# Patient Record
Sex: FEMALE | Race: WHITE
[De-identification: names, ages, dates, MRNs, and addresses within clinical notes are randomized per-mention and may not be internally consistent; named-entity substitution may affect disease eponyms.]

---

## 2018-05-14 ENCOUNTER — HOSPITAL ENCOUNTER (EMERGENCY)
Dept: HOSPITAL 43 - DL.ED | Age: 83
Discharge: HOME | End: 2018-05-14
Payer: MEDICARE

## 2018-05-14 DIAGNOSIS — Z79.82: ICD-10-CM

## 2018-05-14 DIAGNOSIS — I10: Primary | ICD-10-CM

## 2018-05-14 DIAGNOSIS — Z88.8: ICD-10-CM

## 2018-05-14 LAB
CHLORIDE SERPL-SCNC: 96 MMOL/L (ref 101–111)
SODIUM SERPL-SCNC: 133 MMOL/L (ref 135–145)

## 2018-05-14 PROCEDURE — 36415 COLL VENOUS BLD VENIPUNCTURE: CPT

## 2018-05-14 PROCEDURE — 93005 ELECTROCARDIOGRAM TRACING: CPT

## 2018-05-14 PROCEDURE — 80053 COMPREHEN METABOLIC PANEL: CPT

## 2018-05-14 PROCEDURE — 85025 COMPLETE CBC W/AUTO DIFF WBC: CPT

## 2018-05-14 PROCEDURE — 81001 URINALYSIS AUTO W/SCOPE: CPT

## 2018-05-14 PROCEDURE — 84484 ASSAY OF TROPONIN QUANT: CPT

## 2018-05-14 PROCEDURE — 96374 THER/PROPH/DIAG INJ IV PUSH: CPT

## 2018-05-14 PROCEDURE — 71045 X-RAY EXAM CHEST 1 VIEW: CPT

## 2018-05-14 PROCEDURE — 99284 EMERGENCY DEPT VISIT MOD MDM: CPT

## 2018-05-14 PROCEDURE — 93010 ELECTROCARDIOGRAM REPORT: CPT

## 2018-05-14 NOTE — EDM.PDOC
ED HPI GENERAL MEDICAL PROBLEM





- General


Chief Complaint: General


Stated Complaint: HIGH BP. CAME FROM [facility]


Time Seen by Provider: 05/14/18 12:30


Source of Information: Reports: Patient, RN, RN Notes Reviewed


History Limitations: Reports: No Limitations





- History of Present Illness


INITIAL COMMENTS - FREE TEXT/NARRATIVE: 


Pt presents to the ER from [facility] with c/o high blood pressure. Patient states 

she takes BP medications as prescribed. She states she was seen by Madison Tucker GNP, last week and her BP was high, also saw Dr. Ziegler, gynecology, and at 

that time her BP was high. Patient states when she say Madison Tucker today, her 

BP continued to run quite high. She denies CP, SOB, N/V/D, fever, or chills. 

Patient states she has been dizzy at times and feels unsteady. Pt states when 

reading she notices the letters are somewhat "cloudy" but denies any other 

visual disturbances. 


Onset: Gradual





- Related Data


 Allergies











Allergy/AdvReac Type Severity Reaction Status Date / Time


 


atorvastatin [From Lipitor] Allergy  Muscle Verified 05/14/18 12:20





   Aches  


 


rosuvastatin [From Crestor] Allergy  Muscle Verified 05/14/18 12:20





   Aches  











Home Meds: 


 Home Meds





Acetaminophen [Tylenol Arthritis] 650 mg PO PRN 05/14/18 [History]


Alendronate Sodium [Fosamax] 70 mg PO WEEKLY 05/14/18 [History]


Aspirin [Halfprin] 81 mg PO DAILY 05/14/18 [History]


Calcium Carbonate/Vitamin D3 [Calcium 600 + Vit D 200] 1 tab PO DAILY 05/14/18 [

History]


Cholecalciferol (Vitamin D3) [Vitamin D3] 1,000 unit PO DAILY 05/14/18 [History]


Hydrochlorothiazide 25 mg PO DAILY 05/14/18 [History]


Metoprolol Succinate [Toprol XL 50mg] 75 mg PO DAILY 05/14/18 [History]


Multivitamin [Multi-Vitamin Daily] 1 tab PO DAILY 05/14/18 [History]


Potassium Chloride [Klor-Con 10] 10 meq PO DAILY 05/14/18 [History]


Simvastatin [Zocor] 40 mg PO QPM 05/14/18 [History]


Triamcinolone Acetonide [Triamcinolone Acetonide 0.1% Oint] 1 ml TOP PRN 05/14/ 18 [History]











Past Medical History


HEENT History: Reports: Impaired Vision


Cardiovascular History: Reports: High Cholesterol, Hypertension


OB/GYN History: Reports: Other (See Below)


Other OB/BYN History: biposy of vulva area





- Past Surgical History


Musculoskeletal Surgical History: Reports: Other (See Below)


Other Musculoskeletal Surgeries/Procedures:: left elbow





Social & Family History





- Family History


Family Medical History: Noncontributory





- Tobacco Use


Smoking Status *Q: Never Smoker


Second Hand Smoke Exposure: No





- Caffeine Use


Caffeine Use: Reports: Coffee





- Recreational Drug Use


Recreational Drug Use: No





ED ROS GENERAL





- Review of Systems


Review Of Systems: ROS reveals no pertinent complaints other than HPI.





ED EXAM, GENERAL





- Physical Exam


Exam: See Below


Exam Limited By: No Limitations


General Appearance: Alert, WD/WN, No Apparent Distress


Eye Exam: Bilateral Eye: EOMI, Normal Inspection


Ears: Normal External Exam, Hearing Grossly Normal


Nose: Normal Inspection


Throat/Mouth: Normal Inspection, Normal Lips, Normal Teeth, Normal Gums, Normal 

Oropharynx, Normal Voice, No Airway Compromise


Head: Atraumatic, Normocephalic


Neck: Normal Inspection, Supple, Non-Tender, Full Range of Motion


Respiratory/Chest: No Respiratory Distress, Lungs Clear, Normal Breath Sounds, 

No Accessory Muscle Use, Chest Non-Tender


Cardiovascular: Normal Peripheral Pulses, Regular Rate, Rhythm, No Edema, No 

Gallop, No JVD, No Murmur, No Rub


Peripheral Pulses: 2+: Radial (L), Radial (R)


GI/Abdominal: Normal Bowel Sounds, Soft, Non-Tender


 (Female) Exam: Deferred


Rectal (Female) Exam: Deferred


Back Exam: Normal Inspection, Full Range of Motion, NT


Extremities: Normal Inspection, Normal Range of Motion, Non-Tender, Normal 

Capillary Refill, No Pedal Edema


Neurological: Alert, Oriented, CN II-XII Intact, Normal Cognition, Normal Gait, 

Normal Reflexes, No Motor/Sensory Deficits


Psychiatric: Normal Affect, Normal Mood


Skin Exam: Warm, Dry, Intact, Normal Color, No Rash


Lymphatic: No Adenopathy





EKG INTERPRETATION


EKG Date: 05/14/18


Time: 12:43


Rhythm: Other (sinus bradycardia)


Rate (Beats/Min): 56


Axis: Normal


P-Wave: Present


QRS: Normal


ST-T: Normal


QT: Normal


Comparison: NA - No Prior EKG





Course





- Vital Signs


Last Recorded V/S: 


 Last Vital Signs











Temp  98.5 F   05/14/18 12:21


 


Pulse  66   05/14/18 12:21


 


Resp  18   05/14/18 12:21


 


BP  238/74 H  05/14/18 12:21


 


Pulse Ox  97   05/14/18 12:21














- Orders/Labs/Meds


Orders: 


 Active Orders 24 hr











 Category Date Time Status


 


 EKG Documentation Completion [RC] STAT Care  05/14/18 12:49 Active


 


 Peripheral IV Care [RC] .AS DIRECTED Care  05/14/18 12:50 Active


 


 Sodium Chloride 0.9% [Saline Flush] Med  05/14/18 12:49 Active





 10 ml FLUSH ASDIRECTED PRN   


 


 Peripheral IV Insertion Adult [OM.PC] Stat Oth  05/14/18 12:49 Ordered








 Medication Orders





Sodium Chloride (Saline Flush)  10 ml FLUSH ASDIRECTED PRN


   PRN Reason: Keep Vein Open


   Last Admin: 05/14/18 12:59  Dose: 10 ml








Labs: 


 Laboratory Tests











  05/14/18 05/14/18 05/14/18 Range/Units





  12:54 12:54 13:13 


 


WBC  6.3    (5.0-10.0)  10^3/uL


 


RBC  3.80 L    (4.2-5.4)  10^6/uL


 


Hgb  12.1    (12.0-16.0)  g/dL


 


Hct  35.9 L    (37.0-47.0)  %


 


MCV  94.5    ()  fL


 


MCH  31.8    (27.0-34.0)  pg


 


MCHC  33.7    (33.0-35.0)  g/dL


 


Plt Count  251    (150-450)  10^3/uL


 


Neut % (Auto)  74.5    (42.2-75.2)  %


 


Lymph % (Auto)  18.3 L    (20.5-50.1)  %


 


Mono % (Auto)  5.7    (2-8)  %


 


Eos % (Auto)  1.3    (1.0-3.0)  %


 


Baso % (Auto)  0.2    (0.0-1.0)  %


 


Sodium   133 L   (135-145)  mmol/L


 


Potassium   3.6   (3.6-5.0)  mmol/L


 


Chloride   96 L   (101-111)  mmol/L


 


Carbon Dioxide   29.0   (21.0-31.0)  mmol/L


 


Anion Gap   11.6   


 


BUN   14   (7-18)  mg/dL


 


Creatinine   0.7   (0.6-1.3)  mg/dL


 


Est Cr Clr Drug Dosing   48.16   mL/min


 


Estimated GFR (MDRD)   > 60   


 


BUN/Creatinine Ratio   20.00   


 


Glucose   102   ()  mg/dL


 


Calcium   9.2   (8.4-10.2)  mg/dl


 


Total Bilirubin   0.7   (0.2-1.0)  mg/dL


 


AST   25   (10-42)  IU/L


 


ALT   15   (10-60)  IU/L


 


Alkaline Phosphatase   52   ()  IU/L


 


Troponin I   < 0.02   (0.00-0.02)  ng/ml


 


Total Protein   7.3   (6.7-8.2)  g/dl


 


Albumin   3.9   (3.2-5.5)  g/dl


 


Globulin   3.4   


 


Albumin/Globulin Ratio   1.15   


 


Urine Color    Yellow  (YELLOW)  


 


Urine Appearance    Clear  (CLEAR)  


 


Urine pH    7.0  (5.0-9.0)  


 


Ur Specific Gravity    1.020  (1.005-1.030)  


 


Urine Protein    Negative  (NEGATIVE)  


 


Urine Glucose (UA)    Negative  (NEGATIVE)  


 


Urine Ketones    Trace H  (NEGATIVE)  


 


Urine Occult Blood    Small H  (NEGATIVE)  


 


Urine Nitrite    Negative  (NEGATIVE)  


 


Urine Bilirubin    Negative  (NEGATIVE)  


 


Urine Urobilinogen    0.2  (0.2-1.0)  mg/dL


 


Ur Leukocyte Esterase    Moderate H  (NEGATIVE)  


 


Urine RBC    0-5  /HPF


 


Urine WBC    0-5  (0-5/HPF)  /HPF


 


Ur Epithelial Cells    Few  /HPF


 


Urine Bacteria    Rare  (0-FEW/HPF)  /HPF


 


Urine Mucus    Rare  /LPF











Meds: 


Medications











Generic Name Dose Route Start Last Admin





  Trade Name Freq  PRN Reason Stop Dose Admin


 


Sodium Chloride  10 ml  05/14/18 12:49  05/14/18 12:59





  Saline Flush  FLUSH   10 ml





  ASDIRECTED PRN   Administration





  Keep Vein Open   





     





     





     














Discontinued Medications














Generic Name Dose Route Start Last Admin





  Trade Name Freq  PRN Reason Stop Dose Admin


 


Hydralazine HCl  10 mg  05/14/18 13:07  05/14/18 13:14





  Apresoline  IVPUSH  05/14/18 13:08  10 mg





  ONETIME ONE   Administration





     





     





     





     














- Radiology Interpretation


Free Text/Narrative:: 


chest xray: 





No acute findings. Subtle right suprahilar nodular density.





See rad report








Departure





- Departure


Time of Disposition: 15:11


Disposition: Home, Self-Care 01


Condition: Fair


Clinical Impression: 


Hypertension


Qualifiers:


 Hypertension type: unspecified Qualified Code(s): I10 - Essential (primary) 

hypertension








- Discharge Information


Instructions:  How to Take Your Blood Pressure, Easy-to-Read, Hypertension, Easy

-to-Read, DASH Eating Plan


Forms:  ED Department Discharge


Additional Instructions: 


Take your Metoprolol as directed when you get home.


Follow up with ADOLFO Clement, tomorrow at 2:30pm at [facility].


DASH diet








- My Orders


Last 24 Hours: 


My Active Orders





05/14/18 12:49


EKG Documentation Completion [RC] STAT 


Sodium Chloride 0.9% [Saline Flush]   10 ml FLUSH ASDIRECTED PRN 


Peripheral IV Insertion Adult [OM.PC] Stat 





05/14/18 12:50


Peripheral IV Care [RC] .AS DIRECTED 














- Assessment/Plan


Last 24 Hours: 


My Active Orders





05/14/18 12:49


EKG Documentation Completion [RC] STAT 


Sodium Chloride 0.9% [Saline Flush]   10 ml FLUSH ASDIRECTED PRN 


Peripheral IV Insertion Adult [OM.PC] Stat 





05/14/18 12:50


Peripheral IV Care [RC] .AS DIRECTED

## 2018-05-14 NOTE — CR
CLINICAL HISTORY: 83-year-old female with chest pain.

 

 

INTERPRETATION:

 

1. 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

2. External cardiac monitor leads and oxygen cannula. Normal cardiac silhouette without alveolar terrance
a or dependent effusion.

3. Subtle asymmetric suprahilar density on the right probably represents superimposed costochondral c
artilage calcification anterior right 2nd rib however lung nodular differential consideration and rec
ommend at least follow-up upright PA/lateral chest films.

4. No other parenchymal lung nodule or mass lesion and no signs of hilar lymphadenopathy or focal lob
ar pneumonia.

5. No atelectasis/collapse.

6. No pneumothorax or free subdiaphragmatic air.

 

CONCLUSION: No acute cardiopulmonary abnormality. Subtle right suprahilar nodular density (see above)
.

## 2018-05-15 NOTE — EKG
05/14/2018 - ANUJ JOHNSON -

 

FINDINGS:  A 12-lead EKG shows sinus bradycardia with heart rate of 56.  No

significant ST elevation or ST depression noted on this 12-lead EKG except for

nonspecific ST-T wave changes noted on leads V5 and V4.

 

DD:  05/15/2018 12:19:55

DT:  05/15/2018 13:12:27

[facility]

Job #:  [id number]

## 2018-09-16 ENCOUNTER — HOSPITAL ENCOUNTER (EMERGENCY)
Dept: HOSPITAL 43 - DL.ED | Age: 83
Discharge: HOME | End: 2018-09-16
Payer: MEDICARE

## 2018-09-16 DIAGNOSIS — F41.9: Primary | ICD-10-CM

## 2018-09-16 DIAGNOSIS — Z79.82: ICD-10-CM

## 2018-09-16 DIAGNOSIS — E78.00: ICD-10-CM

## 2018-09-16 DIAGNOSIS — Z88.8: ICD-10-CM

## 2018-09-16 DIAGNOSIS — Z79.899: ICD-10-CM

## 2018-09-16 PROCEDURE — 99283 EMERGENCY DEPT VISIT LOW MDM: CPT

## 2018-09-16 NOTE — EDM.PDOC
Scribed by Maribel Middleton 09/16/18 1157 for Roxy Schwartz NP





ED HPI GENERAL MEDICAL PROBLEM





- General


Chief Complaint: Cardiovascular Problem


Stated Complaint: BP VERY HIGH [id number]


Time Seen by Provider: 09/16/18 10:33


Source of Information: Reports: Patient, RN, RN Notes Reviewed


History Limitations: Reports: No Limitations





- History of Present Illness


INITIAL COMMENTS - FREE TEXT/NARRATIVE: 


Patient presents to ER with complaint of high blood pressure. Patient states 

seen by Dr. Ziegler on Friday. Blood pressure 180/80 on that day.Today 200s/

80s. Takes Metoprolol 75 in evenings. No breakfast or coffee today. Pills and 

water only. Denies headache. No unusual visual disturbances or chest pain. 

Somewhat winded but attributes to anxiety. 





- Related Data


 Allergies











Allergy/AdvReac Type Severity Reaction Status Date / Time


 


atorvastatin [From Lipitor] Allergy  Muscle Verified 09/16/18 10:23





   Aches  


 


rosuvastatin [From Crestor] Allergy  Muscle Verified 09/16/18 10:23





   Aches  











Home Meds: 


 Home Meds





Acetaminophen [Tylenol Arthritis] 650 mg PO PRN 05/14/18 [History]


Alendronate Sodium [Fosamax] 70 mg PO WEEKLY 05/14/18 [History]


Aspirin [Halfprin] 81 mg PO DAILY 05/14/18 [History]


Calcium Carbonate/Vitamin D3 [Calcium 600 + Vit D 200] 1 tab PO DAILY 05/14/18 [

History]


Cholecalciferol (Vitamin D3) [Vitamin D3] 1,000 unit PO DAILY 05/14/18 [History]


Metoprolol Succinate [Toprol XL 50mg] 75 mg PO DAILY 05/14/18 [History]


Multivitamin [Multi-Vitamin Daily] 1 tab PO DAILY 05/14/18 [History]


Potassium Chloride [Klor-Con 10] 10 meq PO DAILY 05/14/18 [History]


Simvastatin [Zocor] 40 mg PO QPM 05/14/18 [History]


Triamcinolone Acetonide [Triamcinolone Acetonide 0.1% Oint] 1 ml TOP PRN 05/14/ 18 [History]


hydroCHLOROthiazide [Hydrochlorothiazide] 25 mg PO DAILY 05/14/18 [History]











Past Medical History


HEENT History: Reports: Impaired Vision


Cardiovascular History: Reports: High Cholesterol, Hypertension


OB/GYN History: Reports: Other (See Below)


Other OB/GYN History: biposy of vulva area


Psychiatric History: Reports: Anxiety





- Past Surgical History


Musculoskeletal Surgical History: Reports: Other (See Below)


Other Musculoskeletal Surgeries/Procedures:: left elbow





Social & Family History





- Family History


Family Medical History: Noncontributory





- Tobacco Use


Smoking Status *Q: Never Smoker





- Caffeine Use


Caffeine Use: Reports: Coffee





- Recreational Drug Use


Recreational Drug Use: No





ED ROS GENERAL





- Review of Systems


Review Of Systems: ROS reveals no pertinent complaints other than HPI.





ED EXAM, GENERAL





- Physical Exam


Exam: See Below


Exam Limited By: No Limitations


General Appearance: Alert, WD/WN, No Apparent Distress


Ears: Normal External Exam, Normal Canal, Hearing Grossly Normal, Normal TMs


Nose: Normal Inspection, Normal Mucosa, No Blood


Throat/Mouth: Normal Inspection, Normal Lips, Normal Teeth, Normal Gums, Normal 

Oropharynx, Normal Voice, No Airway Compromise


Head: Atraumatic, Normocephalic


Neck: Normal Inspection, Supple, Non-Tender, Full Range of Motion


Respiratory/Chest: No Respiratory Distress, Lungs Clear, Normal Breath Sounds, 

No Accessory Muscle Use, Chest Non-Tender


Cardiovascular: Normal Peripheral Pulses, Regular Rate, Rhythm, No Edema, No 

Gallop, No JVD, No Murmur, No Rub


GI/Abdominal: Normal Bowel Sounds, Soft, Non-Tender, No Organomegaly, No 

Distention, No Abnormal Bruit, No Mass


 (Female) Exam: Deferred


Rectal (Female) Exam: Deferred


Back Exam: Normal Inspection, Full Range of Motion, NT


Extremities: Normal Inspection, Normal Range of Motion, Non-Tender, Normal 

Capillary Refill, No Pedal Edema


Neurological: Alert, Oriented, CN II-XII Intact, Normal Cognition, Normal Gait, 

Normal Reflexes, No Motor/Sensory Deficits


Psychiatric: Anxious


Skin Exam: Warm, Dry, Intact, Normal Color, No Rash


Lymphatic: No Adenopathy





Course





- Vital Signs


Last Recorded V/S: 


 Last Vital Signs











Temp  97.4 F   09/16/18 11:51


 


Pulse  55 L  09/16/18 11:51


 


Resp  16   09/16/18 11:51


 


BP  175/54 H  09/16/18 11:51


 


Pulse Ox  97   09/16/18 11:51














- Orders/Labs/Meds


Meds: 


Medications














Discontinued Medications














Generic Name Dose Route Start Last Admin





  Trade Name Freq  PRN Reason Stop Dose Admin


 


Lorazepam  0.5 mg  09/16/18 10:46  09/16/18 10:55





  Ativan  PO  09/16/18 10:47  0.5 mg





  ONETIME ONE   Administration





     





     





     





     














- Re-Assessments/Exams


Free Text/Narrative Re-Assessment/Exam: 





09/16/18 11:55


Patient states she is feeling well. Discussed anxiety and blood pressure with 

the patient, and encouraged her to follow up with Madison Tucker NP. She states 

understanding. She states she will call Madison's office in the morning and 

follow up. 





Departure





- Departure


Time of Disposition: 11:56


Disposition: Home, Self-Care 01


Condition: Good


Clinical Impression: 


 Anxiety





Hypertension


Qualifiers:


 Hypertension type: unspecified Qualified Code(s): I10 - Essential (primary) 

hypertension





Instructions:  Panic Attack, Easy-to-Read, Generalized Anxiety Disorder, Adult, 

Hypertension


Forms:  ED Department Discharge


Additional Instructions: 


RX: Lorazepam


Follow up with Madison Tucker NP tomorrow morning. 





I have read and agree with the documentation that has been completed regarding 

this visit. By signing this record, I attest that the documentation was 

completed in my physical presence and is an accurate record of the encounter.

## 2018-09-19 ENCOUNTER — HOSPITAL ENCOUNTER (EMERGENCY)
Dept: HOSPITAL 43 - DL.ED | Age: 83
Discharge: HOME | End: 2018-09-19
Payer: MEDICARE

## 2018-09-19 DIAGNOSIS — Z88.8: ICD-10-CM

## 2018-09-19 DIAGNOSIS — I10: Primary | ICD-10-CM

## 2018-09-19 LAB
ANION GAP SERPL CALC-SCNC: 13 MMOL/L
CHLORIDE SERPL-SCNC: 100 MMOL/L (ref 101–111)
SODIUM SERPL-SCNC: 138 MMOL/L (ref 135–145)

## 2018-09-19 NOTE — EDM.PDOC
ED HPI GENERAL MEDICAL PROBLEM





- General


Chief Complaint: General


Stated Complaint: [id number] TROUBLE WITH HIGH BP


Time Seen by Provider: 09/19/18 13:58


Source of Information: Reports: Patient, Family, RN, RN Notes Reviewed


History Limitations: Reports: No Limitations





- History of Present Illness


INITIAL COMMENTS - FREE TEXT/NARRATIVE: 


Pt to ER with c/o high blood pressure. The patient was seen in the ER on Sunday 

for high BP, and was seen by her PCP on Monday. Her PCP increased her 

Lisinopril and Zoloft which she began yesterday. This morning her BP was high 

on her home machine (220/90). She went to the clinic where they took it and it 

was 180/80 and 140/80. The patient was then called at home by her PCP and told 

to come to the ER for a work up. Patient denies any pain, headache, dizziness, 

visual disturbance, SOB, CP, N/V/D, fever or chills. States she feels fine. She 

took her Lisinopril at 1300 today. 


Onset: Gradual





- Related Data


 Allergies











Allergy/AdvReac Type Severity Reaction Status Date / Time


 


atorvastatin [From Lipitor] Allergy  Muscle Verified 09/19/18 13:41





   Aches  


 


rosuvastatin [From Crestor] Allergy  Muscle Verified 09/19/18 13:41





   Aches  











Home Meds: 


 Home Meds





Acetaminophen [Tylenol Arthritis] 650 mg PO ASDIRECTED PRN 05/14/18 [History]


Alendronate Sodium [Fosamax] 70 mg PO WEEKLY 05/14/18 [History]


Aspirin [Halfprin] 81 mg PO DAILY 05/14/18 [History]


Calcium Carbonate/Vitamin D3 [Calcium 600 + Vit D 200] 1 tab PO DAILY 05/14/18 [

History]


Cholecalciferol (Vitamin D3) [Vitamin D3] 1,000 unit PO DAILY 05/14/18 [History]


Metoprolol Succinate [Toprol XL 50mg] 75 mg PO DAILY 05/14/18 [History]


Multivitamin [Multi-Vitamin Daily] 1 tab PO DAILY 05/14/18 [History]


Potassium Chloride [Klor-Con 10] 10 meq PO DAILY 05/14/18 [History]


Simvastatin [Zocor] 40 mg PO QPM 05/14/18 [History]


Triamcinolone Acetonide [Triamcinolone Acetonide 0.1% Oint] 1 ml TOP ASDIRECTED 

PRN 05/14/18 [History]


hydroCHLOROthiazide [Hydrochlorothiazide] 25 mg PO DAILY 05/14/18 [History]


Lisinopril 1 tab PO DAILY 09/19/18 [History]


Sertraline [Zoloft] 50 mg PO DAILY 09/19/18 [History]











Past Medical History


HEENT History: Reports: Impaired Vision


Cardiovascular History: Reports: High Cholesterol, Hypertension


OB/GYN History: Reports: Other (See Below)


Other OB/GYN History: biposy of vulva area


Psychiatric History: Reports: Anxiety





- Past Surgical History


Musculoskeletal Surgical History: Reports: Other (See Below)


Other Musculoskeletal Surgeries/Procedures:: left elbow





Social & Family History





- Family History


Family Medical History: Noncontributory





- Caffeine Use


Caffeine Use: Reports: Coffee





ED ROS GENERAL





- Review of Systems


Review Of Systems: ROS reveals no pertinent complaints other than HPI.





ED EXAM, GENERAL





- Physical Exam


Exam: See Below


Exam Limited By: No Limitations


General Appearance: Alert, WD/WN, No Apparent Distress


Eye Exam: Bilateral Eye: EOMI, Normal Inspection


Ears: Normal External Exam, Hearing Grossly Normal


Nose: Normal Inspection


Throat/Mouth: Normal Inspection, Normal Voice, No Airway Compromise


Head: Atraumatic, Normocephalic


Neck: Normal Inspection, Supple, Non-Tender, Full Range of Motion


Respiratory/Chest: No Respiratory Distress, Lungs Clear, Normal Breath Sounds, 

No Accessory Muscle Use, Chest Non-Tender


Cardiovascular: Normal Peripheral Pulses, Regular Rate, Rhythm, No Edema, No 

Gallop, No JVD, No Murmur, No Rub


Peripheral Pulses: 2+: Radial (L), Radial (R)


GI/Abdominal: Normal Bowel Sounds, Soft, Non-Tender


 (Female) Exam: Deferred


Rectal (Female) Exam: Deferred


Back Exam: Normal Inspection, Full Range of Motion


Extremities: Normal Inspection, Normal Range of Motion, Non-Tender, No Pedal 

Edema, Normal Capillary Refill


Neurological: Alert, Oriented, CN II-XII Intact, Normal Cognition, Normal Gait, 

Normal Reflexes, No Motor/Sensory Deficits


Psychiatric: Normal Affect, Normal Mood


Skin Exam: Warm, Dry, Intact, Normal Color, No Rash


Lymphatic: No Adenopathy





Course





- Vital Signs


Last Recorded V/S: 


 Last Vital Signs











Temp  98.1 F   09/19/18 13:37


 


Pulse  55 L  09/19/18 13:37


 


Resp  18   09/19/18 13:37


 


BP  231/71 H  09/19/18 13:37


 


Pulse Ox  99   09/19/18 13:37














- Orders/Labs/Meds


Orders: 


 Active Orders 24 hr











 Category Date Time Status


 


 Peripheral IV Care [RC] .AS DIRECTED Care  09/19/18 14:05 Active


 


 Sodium Chloride 0.9% [Saline Flush] Med  09/19/18 14:04 Active





 10 ml FLUSH ASDIRECTED PRN   


 


 Peripheral IV Insertion Adult [OM.PC] Stat Oth  09/19/18 14:04 Ordered








 Medication Orders





Sodium Chloride (Saline Flush)  10 ml FLUSH ASDIRECTED PRN


   PRN Reason: Keep Vein Open


   Last Admin: 09/19/18 14:20  Dose: 10 ml








Labs: 


 Laboratory Tests











  09/19/18 09/19/18 09/19/18 Range/Units





  14:18 14:18 15:44 


 


WBC  5.3    (5.0-10.0)  10^3/uL


 


RBC  3.86 L    (4.2-5.4)  10^6/uL


 


Hgb  12.2    (12.0-16.0)  g/dL


 


Hct  36.6 L    (37.0-47.0)  %


 


MCV  94.8    ()  fL


 


MCH  31.6    (27.0-34.0)  pg


 


MCHC  33.3    (33.0-35.0)  g/dL


 


Plt Count  252    (150-450)  10^3/uL


 


Neut % (Auto)  68.3    (42.2-75.2)  %


 


Lymph % (Auto)  23.0    (20.5-50.1)  %


 


Mono % (Auto)  6.2    (2-8)  %


 


Eos % (Auto)  1.9    (1.0-3.0)  %


 


Baso % (Auto)  0.6    (0.0-1.0)  %


 


Sodium   138   (135-145)  mmol/L


 


Potassium   4.0   (3.6-5.0)  mmol/L


 


Chloride   100 L   (101-111)  mmol/L


 


Carbon Dioxide   29.0   (21.0-31.0)  mmol/L


 


Anion Gap   13.0   


 


BUN   21 H   (7-18)  mg/dL


 


Creatinine   0.7   (0.6-1.3)  mg/dL


 


Est Cr Clr Drug Dosing   45.95   mL/min


 


Estimated GFR (MDRD)   > 60   


 


BUN/Creatinine Ratio   30.00   


 


Glucose   101   ()  mg/dL


 


Calcium   8.9   (8.4-10.2)  mg/dl


 


Total Bilirubin   0.7   (0.2-1.0)  mg/dL


 


AST   27   (10-42)  IU/L


 


ALT   15   (10-60)  IU/L


 


Alkaline Phosphatase   55   ()  IU/L


 


Total Protein   7.1   (6.7-8.2)  g/dl


 


Albumin   3.7   (3.2-5.5)  g/dl


 


Globulin   3.4   


 


Albumin/Globulin Ratio   1.09   


 


Urine Color    Yellow  (YELLOW)  


 


Urine Appearance    Slightly cloudy  (CLEAR)  


 


Urine pH    7.5  (5.0-9.0)  


 


Ur Specific Gravity    1.020  (1.005-1.030)  


 


Urine Protein    Negative  (NEGATIVE)  


 


Urine Glucose (UA)    Negative  (NEGATIVE)  


 


Urine Ketones    Negative  (NEGATIVE)  


 


Urine Occult Blood    Negative  (NEGATIVE)  


 


Urine Nitrite    Negative  (NEGATIVE)  


 


Urine Bilirubin    Negative  (NEGATIVE)  


 


Urine Urobilinogen    0.2  (0.2-1.0)  mg/dL


 


Ur Leukocyte Esterase    Small H  (NEGATIVE)  


 


Urine RBC    0-5  /HPF


 


Urine WBC    5-10 H  (0-5/HPF)  /HPF


 


Ur Epithelial Cells    Rare  /HPF


 


Amorphous Sediment    Moderate H  (0/HPF)  /HPF


 


Urine Bacteria    Few  (0-FEW/HPF)  /HPF











Meds: 


Medications











Generic Name Dose Route Start Last Admin





  Trade Name Freq  PRN Reason Stop Dose Admin


 


Sodium Chloride  10 ml  09/19/18 14:04  09/19/18 14:20





  Saline Flush  FLUSH   10 ml





  ASDIRECTED PRN   Administration





  Keep Vein Open   





     





     





     














Discontinued Medications














Generic Name Dose Route Start Last Admin





  Trade Name Freq  PRN Reason Stop Dose Admin


 


Hydralazine HCl  10 mg  09/19/18 14:05  09/19/18 14:27





  Apresoline  IVPUSH  09/19/18 14:06  10 mg





  ONETIME ONE   Administration





     





     





     





     














Departure





- Departure


Time of Disposition: 16:08


Disposition: Home, Self-Care 01


Condition: Good


Clinical Impression: 


Hypertension


Qualifiers:


 Hypertension type: unspecified Qualified Code(s): I10 - Essential (primary) 

hypertension








- Discharge Information


*PRESCRIPTION DRUG MONITORING PROGRAM REVIEWED*: No


*COPY OF PRESCRIPTION DRUG MONITORING REPORT IN PATIENT CRUZ: No


Forms:  ED Department Discharge


Additional Instructions: 


Continue taking medications as directed


Follow up with your primary care facility








- My Orders


Last 24 Hours: 


My Active Orders





09/19/18 14:04


Sodium Chloride 0.9% [Saline Flush]   10 ml FLUSH ASDIRECTED PRN 


Peripheral IV Insertion Adult [OM.PC] Stat 





09/19/18 14:05


Peripheral IV Care [RC] .AS DIRECTED 














- Assessment/Plan


Last 24 Hours: 


My Active Orders





09/19/18 14:04


Sodium Chloride 0.9% [Saline Flush]   10 ml FLUSH ASDIRECTED PRN 


Peripheral IV Insertion Adult [OM.PC] Stat 





09/19/18 14:05


Peripheral IV Care [RC] .AS DIRECTED

## 2018-09-28 ENCOUNTER — HOSPITAL ENCOUNTER (EMERGENCY)
Dept: HOSPITAL 43 - DL.ED | Age: 83
Discharge: HOME | End: 2018-09-28
Payer: MEDICARE

## 2018-09-28 DIAGNOSIS — Z79.82: ICD-10-CM

## 2018-09-28 DIAGNOSIS — Z79.899: ICD-10-CM

## 2018-09-28 DIAGNOSIS — I10: Primary | ICD-10-CM

## 2018-09-28 DIAGNOSIS — E78.00: ICD-10-CM

## 2018-09-28 DIAGNOSIS — Z88.8: ICD-10-CM

## 2018-09-28 DIAGNOSIS — F41.9: ICD-10-CM

## 2018-09-28 LAB
ANION GAP SERPL CALC-SCNC: 12.6 MMOL/L
CHLORIDE SERPL-SCNC: 95 MMOL/L (ref 101–111)
SODIUM SERPL-SCNC: 133 MMOL/L (ref 135–145)

## 2018-09-28 PROCEDURE — 93005 ELECTROCARDIOGRAM TRACING: CPT

## 2018-09-28 PROCEDURE — 85025 COMPLETE CBC W/AUTO DIFF WBC: CPT

## 2018-09-28 PROCEDURE — 80053 COMPREHEN METABOLIC PANEL: CPT

## 2018-09-28 PROCEDURE — 84484 ASSAY OF TROPONIN QUANT: CPT

## 2018-09-28 PROCEDURE — 99284 EMERGENCY DEPT VISIT MOD MDM: CPT

## 2018-09-28 PROCEDURE — 36415 COLL VENOUS BLD VENIPUNCTURE: CPT

## 2018-09-28 PROCEDURE — 71045 X-RAY EXAM CHEST 1 VIEW: CPT

## 2018-09-28 NOTE — CR
Clinical history: 83-year-old hypertensive female reported on recent chest CT May 2018 4 dyspnea on e
xertion to have "tiny nodule right lower lobe and shotty mediastinal lymphadenopathy". Follow-up plea
se.

 

Interpretation: Upright AP portable chest demonstrates no acute new cardiopulmonary abnormality since
 CT 21 May 2018.

 

Normal cardiac silhouette without cephalization of vascular flow, signs of alveolar edema or dependen
t pleural fluid accumulation.

 

Some shaggy peribronchial "cuffing" but no new lung mass, hilar lymphadenopathy or focal lobar pneumo
sandi.

 

No foreign bodies. No pneumothorax.

## 2018-09-28 NOTE — EDM.PDOC
ED HPI GENERAL MEDICAL PROBLEM





- General


Chief Complaint: Cardiovascular Problem


Stated Complaint: HYPERTENSION. FROM CLINIC


Time Seen by Provider: 09/28/18 13:20


Source of Information: Reports: Patient


History Limitations: Reports: No Limitations





- History of Present Illness


INITIAL COMMENTS - FREE TEXT/NARRATIVE: 





This 84 yo female patient reports to the ED from the [facility] (on 

recommendation of Madison Tucker) due to elevated blood pressures. The patient 

reports that she was placed on a different medication yesterday (Clonidine) and 

took her medication at 1100 yesterday. The patient reports that she has not 

take her medication yet today due to it making her very tired yesterday. The 

patient has been seen in the ED for high blood pressure in the past. Apparently

, the patient's blood pressure has been difficult to manage. 


Onset: Today


Duration: Constant


Location: Reports: Other


Quality: Reports: Other


Severity: Mild


Improves with: Reports: None


Worsens with: Reports: None





- Related Data


 Allergies











Allergy/AdvReac Type Severity Reaction Status Date / Time


 


atorvastatin [From Lipitor] Allergy  Muscle Verified 09/28/18 11:45





   Aches  


 


rosuvastatin [From Crestor] Allergy  Muscle Verified 09/28/18 11:45





   Aches  











Home Meds: 


 Home Meds





Acetaminophen [Tylenol Arthritis] 650 mg PO ASDIRECTED PRN 05/14/18 [History]


Alendronate Sodium [Fosamax] 70 mg PO WEEKLY 05/14/18 [History]


Aspirin [Halfprin] 81 mg PO DAILY 05/14/18 [History]


Calcium Carbonate/Vitamin D3 [Calcium 600 + Vit D 200] 1 tab PO DAILY 05/14/18 [

History]


Cholecalciferol (Vitamin D3) [Vitamin D3] 1,000 unit PO DAILY 05/14/18 [History]


Metoprolol Succinate [Toprol XL 50mg] 75 mg PO DAILY 05/14/18 [History]


Multivitamin [Multi-Vitamin Daily] 1 tab PO DAILY 05/14/18 [History]


Potassium Chloride [Klor-Con 10] 10 meq PO DAILY 05/14/18 [History]


Simvastatin [Zocor] 40 mg PO DAILY 05/14/18 [History]


Triamcinolone Acetonide [Triamcinolone Acetonide 0.1% Oint] 1 ml TOP ASDIRECTED 

PRN 05/14/18 [History]


hydroCHLOROthiazide [Hydrochlorothiazide] 25 mg PO DAILY 05/14/18 [History]


Sertraline [Zoloft] 50 mg PO DAILY 09/19/18 [History]


Losartan [Cozaar] 100 mg PO DAILY 09/28/18 [History]


cloNIDine HCl [Clonidine HCl ER] 0.1 mg PO DAILY 09/28/18 [History]











Past Medical History


HEENT History: Reports: Impaired Vision


Cardiovascular History: Reports: High Cholesterol, Hypertension


OB/GYN History: Reports: Other (See Below)


Other OB/GYN History: biposy of vulva area


Musculoskeletal History: Reports: Arthritis, Osteoporosis


Neurological History: Reports: CVA


Psychiatric History: Reports: Anxiety





- Past Surgical History


Musculoskeletal Surgical History: Reports: Other (See Below)


Other Musculoskeletal Surgeries/Procedures:: left elbow





Social & Family History





- Family History


Family Medical History: Noncontributory





- Tobacco Use


Smoking Status *Q: Never Smoker





- Caffeine Use


Caffeine Use: Reports: Coffee





- Recreational Drug Use


Recreational Drug Use: No





ED ROS GENERAL





- Review of Systems


Review Of Systems: ROS reveals no pertinent complaints other than HPI.





ED EXAM, GENERAL





- Physical Exam


Exam: See Below


Exam Limited By: No Limitations


General Appearance: Alert, WD/WN, No Apparent Distress


Eye Exam: Bilateral Eye: EOMI, Normal Inspection, PERRL


Ears: Normal External Exam, Normal Canal, Hearing Grossly Normal, Normal TMs


Nose: Normal Inspection, Normal Mucosa, No Blood


Throat/Mouth: Normal Inspection, Normal Lips, Normal Teeth, Normal Gums, Normal 

Oropharynx, Normal Voice, No Airway Compromise


Head: Atraumatic, Normocephalic


Neck: Normal Inspection, Supple, Non-Tender, Full Range of Motion


Respiratory/Chest: No Respiratory Distress, Lungs Clear, Normal Breath Sounds, 

No Accessory Muscle Use, Chest Non-Tender


Cardiovascular: Normal Peripheral Pulses, Regular Rate, Rhythm, No Edema, No 

Gallop, No JVD, No Murmur, No Rub


GI/Abdominal: Normal Bowel Sounds


 (Female) Exam: Deferred


Rectal (Female) Exam: Deferred


Back Exam: Normal Inspection, Full Range of Motion, NT


Extremities: Normal Inspection, Normal Range of Motion, Non-Tender, No Pedal 

Edema, Normal Capillary Refill.  No: Pedal Edema


Neurological: Alert, Oriented, CN II-XII Intact, Normal Cognition, Normal Gait, 

Normal Reflexes, No Motor/Sensory Deficits


Psychiatric: Normal Affect, Normal Mood


Skin Exam: Warm, Dry, Intact, Normal Color, No Rash


Lymphatic: No Adenopathy





Course





- Vital Signs


Last Recorded V/S: 


 Last Vital Signs











Temp  36.5 C   09/28/18 12:24


 


Pulse  52 L  09/28/18 12:24


 


Resp  14   09/28/18 12:24


 


BP  175/67 H  09/28/18 14:32


 


Pulse Ox  97   09/28/18 12:24














- Orders/Labs/Meds


Orders: 


 Active Orders 24 hr











 Category Date Time Status


 


 EKG Documentation Completion [RC] URGENT Care  09/28/18 12:17 Active











Labs: 


 Laboratory Tests











  09/28/18 09/28/18 Range/Units





  12:26 12:26 


 


WBC  4.4 L   (5.0-10.0)  10^3/uL


 


RBC  3.81 L   (4.2-5.4)  10^6/uL


 


Hgb  11.8 L   (12.0-16.0)  g/dL


 


Hct  35.4 L   (37.0-47.0)  %


 


MCV  92.9   ()  fL


 


MCH  31.0   (27.0-34.0)  pg


 


MCHC  33.3   (33.0-35.0)  g/dL


 


Plt Count  246   (150-450)  10^3/uL


 


Neut % (Auto)  60.0   (42.2-75.2)  %


 


Lymph % (Auto)  30.2   (20.5-50.1)  %


 


Mono % (Auto)  7.1   (2-8)  %


 


Eos % (Auto)  2.5   (1.0-3.0)  %


 


Baso % (Auto)  0.2   (0.0-1.0)  %


 


Sodium   133 L  (135-145)  mmol/L


 


Potassium   3.6  (3.6-5.0)  mmol/L


 


Chloride   95 L  (101-111)  mmol/L


 


Carbon Dioxide   29.0  (21.0-31.0)  mmol/L


 


Anion Gap   12.6  


 


BUN   15  (7-18)  mg/dL


 


Creatinine   0.6  (0.6-1.3)  mg/dL


 


Est Cr Clr Drug Dosing   TNP  


 


Estimated GFR (MDRD)   > 60  


 


BUN/Creatinine Ratio   25.00  


 


Glucose   99  ()  mg/dL


 


Calcium   9.0  (8.4-10.2)  mg/dl


 


Total Bilirubin   0.9  (0.2-1.0)  mg/dL


 


AST   25  (10-42)  IU/L


 


ALT   15  (10-60)  IU/L


 


Alkaline Phosphatase   54  ()  IU/L


 


Troponin I   < 0.02  (0.00-0.02)  ng/ml


 


Total Protein   6.9  (6.7-8.2)  g/dl


 


Albumin   3.7  (3.2-5.5)  g/dl


 


Globulin   3.2  


 


Albumin/Globulin Ratio   1.16  











Meds: 


Medications














Discontinued Medications














Generic Name Dose Route Start Last Admin





  Trade Name Inge  PRN Reason Stop Dose Admin


 


Clonidine HCl  0.1 mg  09/28/18 13:35  09/28/18 13:44





  Catapres  PO  09/28/18 13:36  0.1 mg





  ONETIME ONE   Administration





     





     





     





     














Departure





- Departure


Time of Disposition: 14:47


Disposition: Home, Self-Care 01


Condition: Fair


Clinical Impression: 


 Anxiety





Hypertension


Qualifiers:


 Hypertension type: unspecified Qualified Code(s): I10 - Essential (primary) 

hypertension





Referrals: 


Madison Tucker PA [Primary Care Provider] - 


Forms:  ED Department Discharge


Care Plan Goals: 


The patient was advised of the examination, lab, EKG and x-ray results during 

the visit. The patient was given a dose of oral medication and her blood 

pressure was decreased. The patient does have an appointment with Dr. Cool on 

10/12/18 for continued evaluation and further management. If the patient has 

any additional symptoms or concerns, the patient should either return to the 

emergency department or follow-up with her primary care facility. 





- My Orders


Last 24 Hours: 


My Active Orders





09/28/18 12:17


EKG Documentation Completion [RC] URGENT 














- Assessment/Plan


Last 24 Hours: 


My Active Orders





09/28/18 12:17


EKG Documentation Completion [RC] URGENT

## 2019-08-27 NOTE — LETTER
2019

 

 

 

Thor Cool MD

[address] [place]  [address]

 

RE:  ANUJ JOHNSON MARGARITA

:  [id number]

 

 

Dear Dr. Cool:

 

Ms. Anuj Johnson had esophagogastroduodenoscopy done this morning and she

tolerated the procedure well.  I herewith send a copy of the endoscopy note and

photographs for your review.

 

Thank you.

 

Sincerely,

 

DD:  2019 07:36:45

DT:  2019 09:06:54

[facility]

Job #:  [id number]

## 2019-08-27 NOTE — OR
DATE:  08/27/2019

 

PROCEDURE:  Esophagogastroduodenoscopy and multiple pinch biopsies.

 

INSTRUMENT USED:  GIF- Olympus video panendoscope.

 

PREMEDICATIONS:  No oral or topical anesthesia used.  Fentanyl 100 mcg

intravenous, Versed 1.5 mg intravenous.  Nasal O2 cannula.

 

The procedure was done under pulse oximetry, BP recording, and cardiac monitor.

 

INDICATION:  The patient with Hemoccult positive stools and negative

colonoscopic examination for any bleeding areas, on long-term aspirin.

Esophagogastroduodenoscopy is performed for detection of any active erosive

lesions, Pena esophagus and/or malignancy also under consideration, H. pylori

status to be determined, endoscopic hemostasis therapy if needed.

 

DESCRIPTION OF PROCEDURE:  The scope was passed with ease.  Adequate

visualization of the esophagus was made from proximal to distal areas.  No upper

esophageal lesions identified.  No distal esophageal stricture.  No uphill or

downhill esophageal varices.  No Deandra-Fuentes tear.  No evidence of erosive

esophagitis by Drumright criteria.  No esophageal polyp or tumor mass

identified.  Z-line was seen at around 39 cm distal to the oral verge.

Configuration consistent with grade 1 by ZAP classification.  No proximal

gastric varices noted.  Gastric fundus examination by retroflexion showed no

polypoid lesions.  No gastric ulcer, malignant mass, or vascular ectasia noted.

Scattered gastric antral erosions were noted without bleeding from them.

Duodenal bulb showed no ulcer.  Visualized second part of the duodenum was

unremarkable.  Multiple pinch biopsies were taken from the gastric antrum and

proximal body and sent for PyloriTek test for H. pylori and if negative in an

hour, the tissue is to be sent for histopathology.  No bleeding was noted from

any of the visualized areas at the completion of examination.  Photographs were

taken of the duodenal bulb, gastric antrum, fundus, and distal esophagus.

 

IMPRESSION:  Gastric antral erosions.

 

The patient tolerated the procedure well.

 

DD:  08/27/2019 07:36:45

DT:  08/27/2019 09:04:32

[facility]

Job #:  [id number]